# Patient Record
Sex: MALE | Race: WHITE | NOT HISPANIC OR LATINO | Employment: FULL TIME | URBAN - NONMETROPOLITAN AREA
[De-identification: names, ages, dates, MRNs, and addresses within clinical notes are randomized per-mention and may not be internally consistent; named-entity substitution may affect disease eponyms.]

---
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
## 2024-06-19 ENCOUNTER — OFFICE VISIT (OUTPATIENT)
Dept: FAMILY MEDICINE | Facility: OTHER | Age: 24
End: 2024-06-19
Attending: STUDENT IN AN ORGANIZED HEALTH CARE EDUCATION/TRAINING PROGRAM
Payer: COMMERCIAL

## 2024-06-19 VITALS
HEIGHT: 71 IN | BODY MASS INDEX: 27.76 KG/M2 | OXYGEN SATURATION: 98 % | TEMPERATURE: 98.1 F | SYSTOLIC BLOOD PRESSURE: 124 MMHG | RESPIRATION RATE: 19 BRPM | HEART RATE: 78 BPM | WEIGHT: 198.3 LBS | DIASTOLIC BLOOD PRESSURE: 72 MMHG

## 2024-06-19 DIAGNOSIS — L03.011 CELLULITIS OF FINGER OF RIGHT HAND: Primary | ICD-10-CM

## 2024-06-19 PROCEDURE — 99213 OFFICE O/P EST LOW 20 MIN: CPT | Performed by: REGISTERED NURSE

## 2024-06-19 RX ORDER — MUPIROCIN 20 MG/G
OINTMENT TOPICAL 2 TIMES DAILY
Qty: 15 G | Refills: 0 | Status: SHIPPED | OUTPATIENT
Start: 2024-06-19 | End: 2024-06-24

## 2024-06-19 ASSESSMENT — PAIN SCALES - GENERAL: PAINLEVEL: NO PAIN (1)

## 2024-06-19 NOTE — PATIENT INSTRUCTIONS
Soak at least daily with warm water and regular salt    Ibuprofen 800mg three times a day. This is max dose. This will help with pain and swelling.     Ice 15-20 minutes 3-4 times a day.     Apply topical ointment twice daily for 5 days.     Keep this area clean and dry. Cover while at work.

## 2024-06-19 NOTE — PROGRESS NOTES
"Clement Grimm  2000    ASSESSMENT/PLAN:   1. Cellulitis of finger of right hand    Edema and pain to right hand middle finger without erythema.  2 puncture marks noted posteriorly distal metacarpal head.  Tetanus up-to-date.  Warm soaks at least once daily.  Ice, 3-4 times daily, 15-20 minutes  Keep clean, dry.  Mupirocin ointment twice daily for 5 days.  Ibuprofen 800 mg 3 times daily for 3 days then as needed.  Monitor for worsening edema or erythema as this would be signs you need an oral antibiotic.    - mupirocin (BACTROBAN) 2 % external ointment; Apply topically 2 times daily for 5 days  Dispense: 15 g; Refill: 0     Patient agrees with plan of care and verbalizes understating. AVS printed. Patient education provided verbally and written instructions provided as requested. Patient made aware of emergent signs and symptoms to monitor for and when to seek additional care/follow up.     SUBJECTIVE:   CHIEF COMPLAINT/ REASON FOR VISIT  Patient presents with:  Finger: Swelling and pain in right middle finger     HISTORY OF PRESENT ILLNESS  Clement Grimm is a pleasant 24 year old male [facility] with concerns of swelling and pain to his right hand middle finger.  Patient works with [employer].  Yesterday, developed pain and edema to right hand middle finger.  He does remember sustaining an injury to this area from something poking him.  He is uncertain what it was.  Has not been using anything for pain.    I have reviewed the nursing notes.  I have reviewed allergies, medication list, problem list, and past medical history.    REVIEW OF SYSTEMS  See HPI    VITAL SIGNS  Vitals:    06/19/24 1156   BP: 124/72   Pulse: 78   Resp: 19   Temp: 98.1  F (36.7  C)   TempSrc: Tympanic   SpO2: 98%   Weight: 89.9 kg (198 lb 4.8 oz)   Height: 1.791 m (5' 10.5\")      Body mass index is 28.05 kg/m .    OBJECTIVE:   PHYSICAL EXAM  Physical Exam  Constitutional:       Appearance: He is not ill-appearing. "   Musculoskeletal:      Comments: Right hand middle finger with edema and tenderness over distal metacarpal, middle finger.  No erythema.  Bilateral hands covered with blackened tar material   Neurological:      Mental Status: He is alert.          DIAGNOSTICS  No results found for any visits on 06/19/24.     PAULIE [facility]ade [employer]

## 2024-06-19 NOTE — NURSING NOTE
"Chief Complaint   Patient presents with    Finger     Swelling and pain in right middle finger     Patient here for right middle finger swelling and pain since last evening. He denies bug bite or injury. Did have some nausea, chills and sweats last night.     Initial /72   Pulse 78   Temp 98.1  F (36.7  C) (Tympanic)   Resp 19   Ht 1.791 m (5' 10.5\")   Wt 89.9 kg (198 lb 4.8 oz)   SpO2 98%   BMI 28.05 kg/m   Estimated body mass index is 28.05 kg/m  as calculated from the following:    Height as of this encounter: 1.791 m (5' 10.5\").    Weight as of this encounter: 89.9 kg (198 lb 4.8 oz).  Medication Review: complete    The next two questions are to help us understand your food security.  If you are feeling you need any assistance in this area, we have resources available to support you today.          6/19/2024   SDOH- Food Insecurity   Within the past 12 months, did you worry that your food would run out before you got money to buy more? N   Within the past 12 months, did the food you bought just not last and you didn t have money to get more? N            Health Care Directive:  Patient does not have a Health Care Directive or Living Will: Discussed advance care planning with patient; however, patient declined at this time.    Annmarie Martinez LPN      "